# Patient Record
Sex: MALE | Race: WHITE
[De-identification: names, ages, dates, MRNs, and addresses within clinical notes are randomized per-mention and may not be internally consistent; named-entity substitution may affect disease eponyms.]

---

## 2021-06-16 ENCOUNTER — HOSPITAL ENCOUNTER (EMERGENCY)
Dept: HOSPITAL 33 - ED | Age: 31
Discharge: HOME | End: 2021-06-16
Payer: MEDICAID

## 2021-06-16 VITALS — DIASTOLIC BLOOD PRESSURE: 59 MMHG | SYSTOLIC BLOOD PRESSURE: 112 MMHG | OXYGEN SATURATION: 96 % | HEART RATE: 92 BPM

## 2021-06-16 DIAGNOSIS — M25.531: ICD-10-CM

## 2021-06-16 DIAGNOSIS — S60.511A: Primary | ICD-10-CM
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
## 2021-06-16 DIAGNOSIS — M79.644: ICD-10-CM
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
## 2021-06-16 DIAGNOSIS — S60.221A: ICD-10-CM
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
## 2021-06-16 DIAGNOSIS — S60.414A: ICD-10-CM

## 2021-06-16 DIAGNOSIS — S60.416A: ICD-10-CM

## 2021-06-16 PROCEDURE — 29131 APPL FINGER SPLINT DYNAMIC: CPT

## 2021-06-16 PROCEDURE — 99284 EMERGENCY DEPT VISIT MOD MDM: CPT

## 2021-06-16 PROCEDURE — 73130 X-RAY EXAM OF HAND: CPT

## 2021-06-16 PROCEDURE — 73110 X-RAY EXAM OF WRIST: CPT

## 2021-06-16 NOTE — ERPHSYRPT
- History of Present Illness


Time Seen by Provider: 06/16/21 18:54


Source: patient


Exam Limitations: no limitations


Physician History: 





This is a right-handed 30-year-old male who states that he does have anger 

issues and punches things often.  He has had right hand fractures in the past.  

4 days ago, he punched a wall.  He has had significant pain in the knuckle of 

the fourth and fifth digits of the right hand.  He is also concerned that there 

is some digit deformities in digits 4 and 5 on the right hand.


Occurred: days ago (4)


Method of Injury: direct blow (Patient punched a wall)


Quality: aching


Severity of Pain-Max: moderate


Severity of Pain-Current: mild (Mild to moderate)


Extremities Pain Location: hand: right, 4th finger: right, 5th finger: right


Modifying Factors: Improves With: movement


Allergies/Adverse Reactions: 








morphine Allergy (Verified 06/16/21 19:07)


   Itching





Home Medications: 








No Reportable Medications [No Reported Medications]  06/16/21 [History]








Travel Risk





- International Travel


Have you traveled outside of the country in past 3 weeks: No





- Coronavirus Screening


Are you exhibiting any of the following symptoms?: No


Close contact with a COVID-19 positive Pt in past 14-21 Days: No





- Review of Systems


Constitutional: No Symptoms


Eyes: No Symptoms


Ears, Nose, & Throat: No Symptoms


Respiratory: No Symptoms


Cardiac: No Symptoms


Abdominal/Gastrointestinal: No Symptoms


Genitourinary Symptoms: No Symptoms


Musculoskeletal: Deformity (Right fourth and fifth digits), Injury (Right hand)


Skin: No Symptoms


Neurological: No Symptoms


Psychological: No Symptoms


Endocrine: No Symptoms


Hematologic/Lymphatic: No Symptoms


Immunological/Allergic: No Symptoms


All Other Systems: Reviewed and Negative





- Past Medical History


Pertinent Past Medical History: Yes


Neurological History: No Pertinent History


ENT History: No Pertinent History


Cardiac History: No Pertinent History


Respiratory History: No Pertinent History


Endocrine Medical History: No Pertinent History


Musculoskeletal History: No Pertinent History


GI Medical History: No Pertinent History


 History: No Pertinent History


Psycho-Social History: No Pertinent History


Male Reproductive Disorders: No Pertinent History





- Past Surgical History


Past Surgical History: Yes


Neuro Surgical History: No Pertinent History


Cardiac: No Pertinent History


Respiratory: No Pertinent History


Genitourinary: No Pertinent History


Musculoskeletal: No Pertinent History


Male Surgical History: No Pertinent History





- Nursing Vital Signs


Nursing Vital Signs: 


                               Initial Vital Signs











Temperature  98.3 F   06/16/21 19:09


 


Pulse Rate  99 H  06/16/21 19:09


 


Respiratory Rate  18   06/16/21 19:09


 


Blood Pressure  118/65   06/16/21 19:09


 


O2 Sat by Pulse Oximetry  97   06/16/21 19:09








                                   Pain Scale











Pain Intensity                 6

















- Physical Exam


General Appearance: no apparent distress, alert, anxiety


Eyes, Ears, Nose, Throat Exam: normal ENT inspection, moist mucous membranes


Neck Exam: normal inspection, non-tender, supple, full range of motion


Cardiovascular/Respiratory Exam: chest non-tender, no respiratory distress


Abdominal Exam: non-tender


Back Exam: normal inspection, normal range of motion, No CVA tenderness, No 

vertebral tenderness


Shoulder Exam: normal inspection, non-tender, no evidence of injury, normal ROM


Elbow/Forearm Exam: normal inspection, non-tender, no evidence of injury, normal

 ROM


Wrist Exam: normal inspection, non-tender, no evidence of injury, normal ROM


Hand Exam: deformity (The patient is holding digits 4 and 5 in more of a flexed 

position then the other digits of the right hand.), limited ROM (Patient states 

that he has tenderness in the fourth and fifth digits at the proximal 

phalanxdistal metacarpal joint space.), stiffness


Neuro/Tendon Exam: normal sensation


Mental Status Exam: alert, oriented x 3, cooperative


Skin Exam: abrasion (Wounds on the knuckles of the right hand.), other (No 

evidence of infection of the right hand)


**SpO2 Interpretation**: normal


O2 Delivery: Room Air





- Course


Nursing assessment & vital signs reviewed: Yes


Ordered Tests: 


                               Active Orders 24 hr











 Category Date Time Status


 


 HAND (MINIMUM 3 VIEWS) Stat Exams  06/16/21 19:42 Taken


 


 WRIST (MIN 3 VIEWS) Stat Exams  06/16/21 19:42 Taken














- Progress


Progress: unchanged


Progress Note: 





06/16/21 20:00


Right hand x-ray shows no acute fracture or dislocation.


Right hand wrist x-ray shows no acute fracture or dislocation.


Counseled pt/family regarding: diagnosis, need for follow-up, rad results





- Departure


Departure Disposition: Home


Clinical Impression: 


 Abrasion of right hand and fingers, Contusion of right hand, initial encounter,

 Right wrist pain





Condition: Stable


Critical Care Time: No


Referrals: 


DOCTOR,NO FAMILY [Primary Care Provider] - [facility] M-[id number] (Patient 

status post punching a wall 4 days ago.  Patient has abrasions and pain in his 

right hand.  Specifically, there is some abnormal positioning of his fingers 4 

and 5 of the right hand.  Question chronic?  No obvious dislocation or fracture 

of his right hand on x-ray)


Additional Instructions: 


Ice pack to right hand and wrist 3-4 times a day over the next 48 hours.  Keep 

the abrasion sites clean with soap and water daily.  May apply antibiotic 

ointment of choice to the sites once a day.  Follow-up tomorrow at [facility] between the hours of 8 and 9:30 in the morning for 

further management.

## 2021-06-17 NOTE — XRAY
Indication: Pain following punching injury 5 days ago.



Comparison: None



3 view right hand obtained.  No bony, articular, or soft tissue abnormalities.

## 2021-06-17 NOTE — XRAY
Indication: Pain following punching injury 5 days ago.



Comparison: None



3 view right wrist obtained.  No bony, articular, or soft tissue abnormalities.

## 2021-11-04 NOTE — XRAY
Indication: Index finger laceration following fall.



Comparison: None



3 view left 2nd finger obtained.  No bony, articular, or soft tissue

abnormalities.

## 2021-11-04 NOTE — ERPHSYRPT
- History of Present Illness


Time Seen by Provider: 11/04/21 12:17


Source: patient


Exam Limitations: no limitations


Patient Subjective Stated Complaint: Pt states "I was on a hover board and I 

fell and got my finger caught in a closet door and I am sure I broke it."


Triage Nursing Assessment: Pt presented alert and oriented X 3, skin pwd Pt 

ambulates with an upright steady gait, able to speak in clear full setnences. Pt

cursing, speaking loud, acting boisterous and overly joking.


Physician History: 





31 years old up-to-date with tetanus presented in the ER with chief complaint of

left index finger pain and mild swelling since yesterday after he fell off of 

hover board and his finger got into a closet door.  There is abrasion but no 

laceration.  Complaining of difficulty movements at interphalangeal joints.  No 

injury anywhere else.


Occurred: yesterday


Method of Injury: fell


Quality: sharpness


Severity of Pain-Max: moderate


Severity of Pain-Current: moderate


Extremities Pain Location: 2nd finger: left


Modifying Factors: Improves With: immobilization.  Worsens With: movement


Associated Symptoms: none


Allergies/Adverse Reactions: 








morphine Allergy (Verified 06/16/21 19:07)


   Itching





Home Medications: 








No Reportable Medications [No Reported Medications]  06/16/21 [History]





Hx Tetanus, Diphtheria Vaccination/Date Given: No


Hx Influenza Vaccination/Date Given: No


Hx Pneumococcal Vaccination/Date Given: No


Immunizations Up to Date: Yes





Travel Risk





- International Travel


Have you traveled outside of the country in past 3 weeks: No





- Coronavirus Screening


Are you exhibiting any of the following symptoms?: No


Close contact with a COVID-19 positive Pt in past 14-21 Days: No





- Vaccine Status


Have you recieved a Covid-19 vaccination: No





- Review of Systems


Constitutional: No Symptoms


Ears, Nose, & Throat: No Symptoms


Respiratory: No Symptoms


Cardiac: No Symptoms


Abdominal/Gastrointestinal: No Symptoms


Musculoskeletal: Injury, Joint Pain


Skin: Skin Lesions


Neurological: No Symptoms


Hematologic/Lymphatic: No Symptoms


Immunological/Allergic: No Symptoms





- Past Medical History


Pertinent Past Medical History: Yes


Neurological History: No Pertinent History


ENT History: No Pertinent History


Cardiac History: No Pertinent History


Respiratory History: No Pertinent History


Endocrine Medical History: No Pertinent History


Musculoskeletal History: No Pertinent History


GI Medical History: No Pertinent History


 History: No Pertinent History


Psycho-Social History: No Pertinent History


Male Reproductive Disorders: No Pertinent History





- Past Surgical History


Past Surgical History: Yes


Neuro Surgical History: No Pertinent History


Cardiac: No Pertinent History


Respiratory: No Pertinent History


Gastrointestinal: Cholecystectomy


Genitourinary: No Pertinent History


Musculoskeletal: No Pertinent History


Male Surgical History: No Pertinent History





- Social History


Smoking Status: Current every day smoker


Exposure to second hand smoke: Yes


Drug Use: none


Patient Lives Alone: No





- Nursing Vital Signs


Nursing Vital Signs: 


                               Initial Vital Signs











Temperature  97.8 F   11/04/21 12:17


 


Pulse Rate  67   11/04/21 12:17


 


Respiratory Rate  20   11/04/21 12:17


 


Blood Pressure  106/83   11/04/21 12:17


 


O2 Sat by Pulse Oximetry  100   11/04/21 12:17








                                   Pain Scale











Pain Intensity                 3

















- Physical Exam


General Appearance: no apparent distress, alert


Eyes, Ears, Nose, Throat Exam: normal ENT inspection


Neck Exam: normal inspection, full range of motion


Cardiovascular/Respiratory Exam: normal breath sounds, regular rate/rhythm


Wrist Exam: normal inspection, non-tender, no evidence of injury


Hand Exam: bone tenderness (Left index finger middle phalanx abrasion with 

minimal swelling and tenderness.  Intact range of motion at proximal and distal 

interphalangeal joints.), soft tissue tenderness


Neuro/Tendon Exam: normal sensation, normal motor functions, normal tendon 

functions


Mental Status Exam: alert, oriented x 3, cooperative


Skin Exam: normal color


**SpO2 Interpretation**: normal


SpO2: 100


O2 Delivery: Room Air


Ordered Tests: 


                               Active Orders 24 hr











 Category Date Time Status


 


 FINGER(S) Stat Exams  11/04/21 12:39 Completed








Medication Summary














Discontinued Medications














Generic Name Dose Route Start Last Admin





  Trade Name Eileen  PRN Reason Stop Dose Admin


 


Acetaminophen  650 mg  11/04/21 12:30  11/04/21 12:31





  Acetaminophen 325 Mg Tablet  PO  11/04/21 12:31  650 mg





  STAT STA   Administration


 


Acetaminophen  Confirm  11/04/21 12:31 





  Acetaminophen 325 Mg Tablet  Administered  11/04/21 12:32 





  Dose  





  650 mg  





  .ROUTE  





  .STK-MED ONE  














- Progress


Progress: improved, pain not gone completely


Progress Note: 





11/04/21 12:58


Given symptomatic treatment for pain.  No difficulty movements at 

interphalangeal joints.  X-rays negative for fracture dislocation.  Recommended 

ice, Tylenol ibuprofen and outpatient follow-up.


Counseled pt/family regarding: diagnosis, need for follow-up, rad results





- Departure


Departure Disposition: Home


Clinical Impression: 


Contusion of finger of left hand


Qualifiers:


 Encounter type: initial encounter Finger: index finger Damage to nail status: 

without damage Qualified Code(s): S60.022A - Contusion of left index finger 

without damage to nail, initial encounter





Condition: Stable


Critical Care Time: No


Referrals: 


DOCTOR,NO FAMILY [Primary Care Provider] - Follow up/PCP as directed


STEPHANIE SOLORIO MD [ACTIVE STAFF] - Follow up/PCP as directed


Instructions:  Finger Fracture (DC)


Additional Instructions: 


Take Tylenol/ibuprofen as needed for pain.  Avoid exertional activities.  

Follow-up with primary care for reevaluation.  Return to ER for worsening pain 

swelling or difficulty movements.

## 2021-11-14 NOTE — XRAY
Indication: 2nd finger pain following fall days ago.



Comparison: November 4, 2021.



3 view left 2nd finger obtained.  Again no bony, articular, or soft tissue

abnormalities.

## 2021-11-14 NOTE — ERPHSYRPT
- History of Present Illness


Time Seen by Provider: 11/14/21 07:12


Source: patient


Exam Limitations: no limitations


Patient Subjective Stated Complaint: pt states "I was in here 4-5 days ago and 

was told I had a fracture."


Triage Nursing Assessment: pt ambulated into the er; pt is axo x4; c/o left 

index pain; hx left index fx; pt denies f/u with ortho; pt states 7/10; pt has 

limited ROM; good cap refill to left index finger; hypertension


Physician History: 





51 years old right-handed dominant male presented in the ER with chief complaint

of left index finger pain and swelling for the last 10 days after he fell and or

stretches index finger.  Patient was evaluated in the ER and had a negative x-

ray, was recommended symptomatic treatment but patient kept on moving his 

finger/hand with exertional activities as well and pain is getting worse, 

moderate to severe intensity, sharp nature, more with movements and associated 

swelling of index finger.


Occurred: days ago (10)


Method of Injury: fell, twisted


Quality: sharpness


Severity of Pain-Max: moderate


Severity of Pain-Current: moderate


Extremities Pain Location: 2nd finger: left


Modifying Factors: Improves With: immobilization.  Worsens With: movement


Associated Symptoms: none


Allergies/Adverse Reactions: 








morphine Allergy (Verified 11/14/21 06:52)


   Itching





Hx Tetanus, Diphtheria Vaccination/Date Given: No


Hx Influenza Vaccination/Date Given: No


Hx Pneumococcal Vaccination/Date Given: No





Travel Risk





- International Travel


Have you traveled outside of the country in past 3 weeks: No





- Coronavirus Screening


Are you exhibiting any of the following symptoms?: No


Close contact with a COVID-19 positive Pt in past 14-21 Days: No





- Vaccine Status


Have you recieved a Covid-19 vaccination: No





- Review of Systems


Constitutional: No Symptoms


Ears, Nose, & Throat: No Symptoms


Respiratory: No Symptoms


Cardiac: No Symptoms


Abdominal/Gastrointestinal: No Symptoms


Genitourinary Symptoms: No Symptoms


Musculoskeletal: Injury, Joint Pain, Joint Swelling


Skin: No Symptoms


Endocrine: No Symptoms





- Past Medical History


Pertinent Past Medical History: Yes


Neurological History: No Pertinent History


ENT History: No Pertinent History


Cardiac History: No Pertinent History


Respiratory History: No Pertinent History


Endocrine Medical History: No Pertinent History


Musculoskeletal History: No Pertinent History


GI Medical History: No Pertinent History


 History: No Pertinent History


Psycho-Social History: No Pertinent History


Male Reproductive Disorders: No Pertinent History





- Past Surgical History


Past Surgical History: Yes


Neuro Surgical History: No Pertinent History


Cardiac: No Pertinent History


Respiratory: No Pertinent History


Gastrointestinal: Cholecystectomy


Genitourinary: No Pertinent History


Musculoskeletal: No Pertinent History


Male Surgical History: No Pertinent History





- Social History


Smoking Status: Current every day smoker


Exposure to second hand smoke: Yes


Drug Use: none


Patient Lives Alone: No





- Nursing Vital Signs


Nursing Vital Signs: 





                               Initial Vital Signs











Temperature  98.2 F   11/14/21 06:52


 


Pulse Rate  101 H  11/14/21 06:52


 


Respiratory Rate  18   11/14/21 06:52


 


Blood Pressure  150/99   11/14/21 06:52


 


O2 Sat by Pulse Oximetry  100   11/14/21 06:52








                                   Pain Scale











Pain Intensity                 6

















- Physical Exam


General Appearance: no apparent distress, alert


Eyes, Ears, Nose, Throat Exam: normal ENT inspection


Neck Exam: normal inspection, supple, full range of motion


Cardiovascular/Respiratory Exam: normal breath sounds, regular rate/rhythm


Wrist Exam: normal inspection, non-tender, no evidence of injury, normal ROM


Hand Exam: limited ROM (Left index finger diffuse swelling.  Intact distal 

neurovascular.), soft tissue tenderness, swelling


Neuro/Tendon Exam: normal sensation, normal motor functions


Mental Status Exam: alert, oriented x 3, cooperative


Skin Exam: normal color


**SpO2 Interpretation**: normal


SpO2: 100


O2 Delivery: Room Air


Ordered Tests: 





                               Active Orders 24 hr











 Category Date Time Status


 


 FINGER(S) Stat Exams  11/14/21 07:34 Taken








Medication Summary














Discontinued Medications














Generic Name Dose Route Start Last Admin





  Trade Name Christophq  PRN Reason Stop Dose Admin


 


Ketorolac Tromethamine  30 mg  11/14/21 07:14  11/14/21 07:21





  Ketorolac Tromethamine 30 Mg/Ml Inj  IM  11/14/21 07:15  30 mg





  STAT ONE   Administration


 


Ketorolac Tromethamine  Confirm  11/14/21 07:15 





  Ketorolac Tromethamine 30 Mg/Ml Inj  Administered  11/14/21 07:16 





  Dose  





  30 mg  





  .ROUTE  





  .STK-MED ONE  














- Progress


Progress: improved, pain not gone completely


Progress Note: 





11/14/21 07:54


Given Toradol for symptomatic relief.  Repeated x-rays are negative for any cute

fracture dislocation.  Recommended avoiding exertional activities.  Will place 

in finger splint and outpatient orthopedic follow-up.


Counseled pt/family regarding: diagnosis, need for follow-up, rad results





- Departure


Departure Disposition: Home


Clinical Impression: 


 Strain of left index finger





Condition: Stable


Critical Care Time: No


Referrals: 


DOCTOR,NO FAMILY [Primary Care Provider] - Follow up/PCP as directed


WINTER MADRID MD [NON-STAFF PHY W/O PRIVILEGES] - Follow up/PCP as directed


Instructions:  Finger Sprain (DC)


Additional Instructions: 


Take Tylenol/ibuprofen as needed for pain.  Apply intermittent ice.  Avoid 

exertional activities.  Follow-up with orthopedic surgery for reevaluation.  

Return to ER for any worsening.


Prescriptions: 


Ibuprofen 600 mg PO Q6HPRN PRN 10 Days #20 tablet


 PRN Reason: Pain